# Patient Record
Sex: FEMALE | Race: WHITE | NOT HISPANIC OR LATINO | Employment: FULL TIME | URBAN - METROPOLITAN AREA
[De-identification: names, ages, dates, MRNs, and addresses within clinical notes are randomized per-mention and may not be internally consistent; named-entity substitution may affect disease eponyms.]

---

## 2017-05-31 ENCOUNTER — APPOINTMENT (OUTPATIENT)
Dept: LAB | Facility: CLINIC | Age: 52
End: 2017-05-31
Payer: COMMERCIAL

## 2017-05-31 ENCOUNTER — TRANSCRIBE ORDERS (OUTPATIENT)
Dept: LAB | Facility: CLINIC | Age: 52
End: 2017-05-31

## 2017-05-31 DIAGNOSIS — M79.0 RHEUMATISM, UNSPECIFIED AND FIBROSITIS: Primary | ICD-10-CM

## 2017-05-31 DIAGNOSIS — M79.7 RHEUMATISM, UNSPECIFIED AND FIBROSITIS: Primary | ICD-10-CM

## 2017-05-31 DIAGNOSIS — M79.7 RHEUMATISM, UNSPECIFIED AND FIBROSITIS: ICD-10-CM

## 2017-05-31 DIAGNOSIS — M79.0 RHEUMATISM, UNSPECIFIED AND FIBROSITIS: ICD-10-CM

## 2017-05-31 LAB
25(OH)D3 SERPL-MCNC: 25.9 NG/ML (ref 30–100)
ALBUMIN SERPL BCP-MCNC: 3.7 G/DL (ref 3.5–5)
ALP SERPL-CCNC: 99 U/L (ref 46–116)
ALT SERPL W P-5'-P-CCNC: 52 U/L (ref 12–78)
ANION GAP SERPL CALCULATED.3IONS-SCNC: 7 MMOL/L (ref 4–13)
AST SERPL W P-5'-P-CCNC: 32 U/L (ref 5–45)
BILIRUB SERPL-MCNC: 0.4 MG/DL (ref 0.2–1)
BILIRUB UR QL STRIP: NEGATIVE
BUN SERPL-MCNC: 12 MG/DL (ref 5–25)
CALCIUM SERPL-MCNC: 8.7 MG/DL (ref 8.3–10.1)
CHLORIDE SERPL-SCNC: 107 MMOL/L (ref 100–108)
CK SERPL-CCNC: 123 U/L (ref 26–192)
CLARITY UR: NORMAL
CO2 SERPL-SCNC: 26 MMOL/L (ref 21–32)
COLOR UR: YELLOW
CREAT SERPL-MCNC: 0.69 MG/DL (ref 0.6–1.3)
CRP SERPL QL: 15.3 MG/L
ERYTHROCYTE [DISTWIDTH] IN BLOOD BY AUTOMATED COUNT: 13.7 % (ref 11.6–15.1)
ERYTHROCYTE [SEDIMENTATION RATE] IN BLOOD: 9 MM/HOUR (ref 0–20)
GFR SERPL CREATININE-BSD FRML MDRD: >60 ML/MIN/1.73SQ M
GLUCOSE P FAST SERPL-MCNC: 104 MG/DL (ref 65–99)
GLUCOSE UR STRIP-MCNC: NEGATIVE MG/DL
HCT VFR BLD AUTO: 43 % (ref 34.8–46.1)
HGB BLD-MCNC: 14.1 G/DL (ref 11.5–15.4)
HGB UR QL STRIP.AUTO: NEGATIVE
IRON SERPL-MCNC: 78 UG/DL (ref 50–170)
KETONES UR STRIP-MCNC: NEGATIVE MG/DL
LEUKOCYTE ESTERASE UR QL STRIP: NEGATIVE
MAGNESIUM SERPL-MCNC: 2.1 MG/DL (ref 1.6–2.6)
MCH RBC QN AUTO: 31.2 PG (ref 26.8–34.3)
MCHC RBC AUTO-ENTMCNC: 32.8 G/DL (ref 31.4–37.4)
MCV RBC AUTO: 95 FL (ref 82–98)
NITRITE UR QL STRIP: NEGATIVE
PH UR STRIP.AUTO: 6 [PH] (ref 4.5–8)
PLATELET # BLD AUTO: 190 THOUSANDS/UL (ref 149–390)
PMV BLD AUTO: 12.2 FL (ref 8.9–12.7)
POTASSIUM SERPL-SCNC: 4 MMOL/L (ref 3.5–5.3)
PROT SERPL-MCNC: 7.1 G/DL (ref 6.4–8.2)
PROT UR STRIP-MCNC: NEGATIVE MG/DL
RBC # BLD AUTO: 4.52 MILLION/UL (ref 3.81–5.12)
SODIUM SERPL-SCNC: 140 MMOL/L (ref 136–145)
SP GR UR STRIP.AUTO: 1.02 (ref 1–1.03)
T4 FREE SERPL-MCNC: 0.87 NG/DL (ref 0.76–1.46)
TSH SERPL DL<=0.05 MIU/L-ACNC: 1.4 UIU/ML (ref 0.36–3.74)
UROBILINOGEN UR QL STRIP.AUTO: 0.2 E.U./DL
WBC # BLD AUTO: 4.68 THOUSAND/UL (ref 4.31–10.16)

## 2017-05-31 PROCEDURE — 83540 ASSAY OF IRON: CPT

## 2017-05-31 PROCEDURE — 86038 ANTINUCLEAR ANTIBODIES: CPT

## 2017-05-31 PROCEDURE — 86140 C-REACTIVE PROTEIN: CPT

## 2017-05-31 PROCEDURE — 82306 VITAMIN D 25 HYDROXY: CPT

## 2017-05-31 PROCEDURE — 80053 COMPREHEN METABOLIC PANEL: CPT

## 2017-05-31 PROCEDURE — 81003 URINALYSIS AUTO W/O SCOPE: CPT

## 2017-05-31 PROCEDURE — 83735 ASSAY OF MAGNESIUM: CPT

## 2017-05-31 PROCEDURE — 86235 NUCLEAR ANTIGEN ANTIBODY: CPT

## 2017-05-31 PROCEDURE — 84439 ASSAY OF FREE THYROXINE: CPT

## 2017-05-31 PROCEDURE — 86200 CCP ANTIBODY: CPT

## 2017-05-31 PROCEDURE — 82550 ASSAY OF CK (CPK): CPT

## 2017-05-31 PROCEDURE — 84443 ASSAY THYROID STIM HORMONE: CPT

## 2017-05-31 PROCEDURE — 81374 HLA I TYPING 1 ANTIGEN LR: CPT

## 2017-05-31 PROCEDURE — 86430 RHEUMATOID FACTOR TEST QUAL: CPT

## 2017-05-31 PROCEDURE — 85027 COMPLETE CBC AUTOMATED: CPT

## 2017-05-31 PROCEDURE — 36415 COLL VENOUS BLD VENIPUNCTURE: CPT

## 2017-05-31 PROCEDURE — 85652 RBC SED RATE AUTOMATED: CPT

## 2017-06-01 LAB
ENA SS-A AB SER-ACNC: >8 AI (ref 0–0.9)
ENA SS-B AB SER-ACNC: <0.2 AI (ref 0–0.9)
RHEUMATOID FACT SER QL LA: NEGATIVE

## 2017-06-02 LAB
CCP IGA+IGG SERPL IA-ACNC: 33 UNITS (ref 0–19)
RYE IGE QN: NEGATIVE

## 2017-06-06 LAB — HLA-B27 QL NAA+PROBE: NEGATIVE

## 2018-01-13 NOTE — MISCELLANEOUS
Provider Comments  Provider Comments:   patient did not show for the appt today as a NP, left message on her voice mail to rs      Signatures   Electronically signed by : ONUR Marsh ; Aug 13 2016 10:36PM EST                       (Author)

## 2018-01-13 NOTE — MISCELLANEOUS
Message  Received pt's imaging discs and reports Sent to [facility] for outside film review on 07/20/16 Spoke with Olivia La in [facility] She will have them uploaded and review done      Signatures   Electronically signed by :  Joanna Silva, ; Jul 20 2016  1:14PM EST                       (Author)

## 2018-07-22 ENCOUNTER — APPOINTMENT (EMERGENCY)
Dept: RADIOLOGY | Facility: HOSPITAL | Age: 53
End: 2018-07-22
Payer: COMMERCIAL

## 2018-07-22 ENCOUNTER — HOSPITAL ENCOUNTER (EMERGENCY)
Facility: HOSPITAL | Age: 53
Discharge: HOME | End: 2018-07-22
Attending: INTERNAL MEDICINE | Admitting: INTERNAL MEDICINE
Payer: COMMERCIAL

## 2018-07-22 VITALS
SYSTOLIC BLOOD PRESSURE: 138 MMHG | DIASTOLIC BLOOD PRESSURE: 83 MMHG | HEART RATE: 65 BPM | TEMPERATURE: 98.2 F | BODY MASS INDEX: 29.88 KG/M2 | WEIGHT: 175 LBS | RESPIRATION RATE: 18 BRPM | OXYGEN SATURATION: 99 % | HEIGHT: 64 IN

## 2018-07-22 DIAGNOSIS — M77.11 EPICONDYLITIS, LATERAL, RIGHT: Primary | ICD-10-CM

## 2018-07-22 PROCEDURE — 73080 X-RAY EXAM OF ELBOW: CPT

## 2018-07-22 PROCEDURE — 96372 THER/PROPH/DIAG INJ SC/IM: CPT

## 2018-07-22 PROCEDURE — 99283 EMERGENCY DEPT VISIT LOW MDM: CPT

## 2018-07-22 RX ORDER — NAPROXEN 500 MG/1
500 TABLET ORAL 2 TIMES DAILY WITH MEALS
Qty: 30 TABLET | Refills: 0 | Status: SHIPPED | OUTPATIENT
Start: 2018-07-22 | End: 2018-08-26

## 2018-07-22 RX ORDER — SUMATRIPTAN 50 MG/1
TABLET, FILM COATED ORAL
COMMUNITY

## 2018-07-22 RX ORDER — KETOROLAC TROMETHAMINE 30 MG/ML
30 INJECTION, SOLUTION INTRAMUSCULAR; INTRAVENOUS ONCE
Status: COMPLETED | OUTPATIENT
Start: 2018-07-22 | End: 2018-07-22

## 2018-07-22 RX ORDER — ESCITALOPRAM OXALATE 20 MG/1
TABLET ORAL
COMMUNITY

## 2018-07-22 RX ADMIN — KETOROLAC TROMETHAMINE 30 MG: 30 INJECTION, SOLUTION INTRAMUSCULAR at 16:33

## 2018-07-22 NOTE — DISCHARGE INSTRUCTIONS
Tennis Elbow   WHAT YOU NEED TO KNOW:   Tennis elbow is inflammation of the tendons in your elbow  Tendons are strong tissues that connect muscle to bone  DISCHARGE INSTRUCTIONS:   Return to the emergency department if:   · You suddenly have no feeling in your arm, hand, or fingers  · You suddenly cannot move your arm, wrist, hand, or fingers  Contact your healthcare provider if:   · You have a fever  · You have more pain or weakness in your arm, wrist, hand, or fingers  · You have new numbness or tingling in your arm, hand, or fingers  · You have questions or concerns about your condition or care  Medicines:   · Acetaminophen  decreases pain and fever  It is available without a doctor's order  Ask how much to take and how often to take it  Follow directions  Read the labels of all other medicines you are using to see if they also contain acetaminophen, or ask your doctor or pharmacist  Acetaminophen can cause liver damage if not taken correctly  Do not use more than 4 grams (4,000 milligrams) total of acetaminophen in one day  · NSAIDs , such as ibuprofen, help decrease swelling, pain, and fever  This medicine is available with or without a doctor's order  NSAIDs can cause stomach bleeding or kidney problems in certain people  If you take blood thinner medicine, always ask your healthcare provider if NSAIDs are safe for you  Always read the medicine label and follow directions  · Take your medicine as directed  Contact your healthcare provider if you think your medicine is not helping or if you have side effects  Tell him or her if you are allergic to any medicine  Keep a list of the medicines, vitamins, and herbs you take  Include the amounts, and when and why you take them  Bring the list or the pill bottles to follow-up visits  Carry your medicine list with you in case of an emergency    Physical therapy:  A physical therapist teaches you exercises to help improve movement and strength, and to decrease pain  Self-care:   · Wear your support device as directed  Devices, such as an arm strap, brace, or splint, help limit your arm movement  They also decrease pain and help prevent more damage to your tendon  Ask your healthcare provider how to care for your arm while you wear a brace or splint  · Rest your injured arm  and avoid activities that cause pain  This will help your tendons heal     · Apply ice on your elbow  for 15 to 20 minutes every hour or as directed  Use an ice pack, or put crushed ice in a plastic bag  Cover it with a towel before you apply it to your skin  Ice helps prevent tissue damage and decreases swelling and pain  · Elevate your elbow  above the level of your heart as often as you can  This will help decrease swelling and pain  Prop your elbow on pillows or blankets to keep it elevated comfortably  Follow up with your healthcare provider as directed:  Write down your questions so you remember to ask them during your visits  © 2017 [facility] Information is for End User's use only and may not be sold, redistributed or otherwise used for commercial purposes  All illustrations and images included in CareNotes® are the copyrighted property of A D A M , Inc  or J[employer]  The above information is an  only  It is not intended as medical advice for individual conditions or treatments  Talk to your doctor, nurse or pharmacist before following any medical regimen to see if it is safe and effective for you

## 2018-07-22 NOTE — ED PROVIDER NOTES
History  Chief Complaint   Patient presents with    Elbow Injury     According to the patient, she had hit her right elbow door hinge about 2 weeks ago  49-year-old female presents emergency room with right elbow pain  Patient states about 2 weeks ago she slammed her elbow into a doorjamb  She states the pain has been getting progressively worse and she is having numbness in her fingers  She is complaining of pain with extension and flexion  Prior to Admission Medications   Prescriptions Last Dose Informant Patient Reported? Taking? SUMAtriptan (IMITREX) 50 mg tablet   Yes No   Sig: sumatriptan 50 mg tablet   escitalopram (LEXAPRO) 20 mg tablet   Yes No   Sig: escitalopram 20 mg tablet      Facility-Administered Medications: None       Past Medical History:   Diagnosis Date    Arthritis     Fibromyalgia     Sjoegren syndrome ([facility] [address] )        Past Surgical History:   Procedure Laterality Date    HYSTERECTOMY      PARATHYROID GLAND SURGERY         History reviewed  No pertinent family history  I have reviewed and agree with the history as documented  Social History   Substance Use Topics    Smoking status: Never Smoker    Smokeless tobacco: Never Used    Alcohol use Yes      Comment: occ  Review of Systems   Constitutional: Negative  Respiratory: Negative  Cardiovascular: Negative  Gastrointestinal: Negative  Genitourinary: Negative  Musculoskeletal: Negative  Right elbow pain but no swelling no erythema the skin is intact  Skin: Negative  Neurological: Negative  Hematological: Negative  Psychiatric/Behavioral: Negative  Physical Exam  Physical Exam   Constitutional: She is oriented to person, place, and time  She appears well-developed and well-nourished  HENT:   Head: Normocephalic and atraumatic  Neck: Normal range of motion  Cardiovascular: Normal rate and regular rhythm      Pulmonary/Chest: Effort normal and breath sounds normal  Musculoskeletal:   Patient's upper extremity examination shows arms to be symmetric  There is no swelling in the right elbow or wrist of the upper extremity  She has a normal mass tone sensation DTRs are intact pulses are equal bilaterally  Capillary refill is intact  Patient has decreased grasp in her right hand  And she does have pain on extension and flexion, she is able to supinate and pronate with minimal discomfort  Neurological: She is alert and oriented to person, place, and time  Skin: Skin is dry  Psychiatric: She has a normal mood and affect  Her behavior is normal  Judgment and thought content normal        Vital Signs  ED Triage Vitals [07/22/18 1454]   Temperature Pulse Respirations Blood Pressure SpO2   98 9 °F (37 2 °C) 78 18 147/72 99 %      Temp Source Heart Rate Source Patient Position - Orthostatic VS BP Location FiO2 (%)   Tympanic Monitor Lying Right arm --      Pain Score       7           Vitals:    07/22/18 1454   BP: 147/72   Pulse: 78   Patient Position - Orthostatic VS: Lying       Visual Acuity      ED Medications  Medications   ketorolac (TORADOL) injection 30 mg (30 mg Intramuscular Given 7/22/18 1633)       Diagnostic Studies  Results Reviewed     None                 XR elbow 3+ vw RIGHT   Final Result by Trista Andino (07/22 1618)                 Procedures  Procedures       Phone Contacts  ED Phone Contact    ED Course                               MDM  CritCare Time    Disposition  Final diagnoses:   None     ED Disposition     None      Follow-up Information    None         Patient's Medications   Discharge Prescriptions    No medications on file     No discharge procedures on file      ED Provider  Electronically Signed by           Trudy Moreno MD  07/22/18 9438       Trudy Moreno MD  07/22/18 647 Remy Orellana MD  07/22/18 6603

## 2018-08-26 ENCOUNTER — HOSPITAL ENCOUNTER (EMERGENCY)
Facility: HOSPITAL | Age: 53
Discharge: HOME | End: 2018-08-27
Admitting: EMERGENCY MEDICINE
Payer: COMMERCIAL

## 2018-08-26 ENCOUNTER — APPOINTMENT (EMERGENCY)
Dept: RADIOLOGY | Facility: HOSPITAL | Age: 53
End: 2018-08-26
Payer: COMMERCIAL

## 2018-08-26 DIAGNOSIS — S90.32XA CONTUSION OF LEFT FOOT, INITIAL ENCOUNTER: Primary | ICD-10-CM

## 2018-08-26 PROCEDURE — 96372 THER/PROPH/DIAG INJ SC/IM: CPT

## 2018-08-26 PROCEDURE — 73620 X-RAY EXAM OF FOOT: CPT

## 2018-08-26 RX ORDER — OXYCODONE HYDROCHLORIDE AND ACETAMINOPHEN 5; 325 MG/1; MG/1
1 TABLET ORAL ONCE
Status: COMPLETED | OUTPATIENT
Start: 2018-08-27 | End: 2018-08-27

## 2018-08-26 RX ORDER — KETOROLAC TROMETHAMINE 30 MG/ML
30 INJECTION, SOLUTION INTRAMUSCULAR; INTRAVENOUS ONCE
Status: COMPLETED | OUTPATIENT
Start: 2018-08-26 | End: 2018-08-26

## 2018-08-26 RX ORDER — TRAMADOL HYDROCHLORIDE 50 MG/1
50 TABLET ORAL EVERY 6 HOURS PRN
COMMUNITY

## 2018-08-26 RX ADMIN — KETOROLAC TROMETHAMINE 30 MG: 30 INJECTION, SOLUTION INTRAMUSCULAR; INTRAVENOUS at 23:28

## 2018-08-27 VITALS
TEMPERATURE: 98.5 F | WEIGHT: 180 LBS | RESPIRATION RATE: 16 BRPM | DIASTOLIC BLOOD PRESSURE: 79 MMHG | OXYGEN SATURATION: 96 % | BODY MASS INDEX: 30.73 KG/M2 | SYSTOLIC BLOOD PRESSURE: 126 MMHG | HEIGHT: 64 IN | HEART RATE: 74 BPM

## 2018-08-27 PROCEDURE — 99283 EMERGENCY DEPT VISIT LOW MDM: CPT

## 2018-08-27 RX ORDER — KETOROLAC TROMETHAMINE 10 MG/1
10 TABLET, FILM COATED ORAL 3 TIMES DAILY
Qty: 15 TABLET | Refills: 0 | Status: SHIPPED | OUTPATIENT
Start: 2018-08-27 | End: 2018-12-17

## 2018-08-27 RX ORDER — OXYCODONE HYDROCHLORIDE AND ACETAMINOPHEN 5; 325 MG/1; MG/1
1 TABLET ORAL EVERY 4 HOURS PRN
Qty: 8 TABLET | Refills: 0 | Status: SHIPPED | OUTPATIENT
Start: 2018-08-27 | End: 2018-08-29

## 2018-08-27 RX ADMIN — OXYCODONE HYDROCHLORIDE AND ACETAMINOPHEN 1 TABLET: 5; 325 TABLET ORAL at 00:01

## 2018-08-27 NOTE — ED PROVIDER NOTES
History  Chief Complaint   Patient presents with    Foot Pain     fell from stool approx 2 ft  unable to move toes     Darrall Reeve and hurt left foot  No other injuries            Prior to Admission Medications   Prescriptions Last Dose Informant Patient Reported? Taking? SUMAtriptan (IMITREX) 50 mg tablet Past Month at Unknown time  Yes Yes   Sig: sumatriptan 50 mg tablet   escitalopram (LEXAPRO) 20 mg tablet 8/25/2018 at Unknown time  Yes Yes   Sig: escitalopram 20 mg tablet   traMADol (ULTRAM) 50 mg tablet Past Month at Unknown time  Yes Yes   Sig: Take 50 mg by mouth every 6 (six) hours as needed for moderate pain      Facility-Administered Medications: None       Past Medical History:   Diagnosis Date    Arthritis     Fibromyalgia     Fibromyalgia     Sjoegren syndrome (H[facility])        Past Surgical History:   Procedure Laterality Date    HYSTERECTOMY      PARATHYROID GLAND SURGERY      PARATHYROIDECTOMY      VEIN LIGATION AND STRIPPING         No family history on file  I have reviewed and agree with the history as documented  Social History   Substance Use Topics    Smoking status: Never Smoker    Smokeless tobacco: Never Used    Alcohol use Yes      Comment: occ  Review of Systems   Constitutional: Negative for chills and fever  HENT: Negative for nosebleeds  Respiratory: Negative for chest tightness and shortness of breath  Cardiovascular: Negative for chest pain  Gastrointestinal: Negative for abdominal pain  Musculoskeletal: Positive for joint swelling  Negative for back pain  Neurological: Negative for headaches  Physical Exam  Physical Exam   Constitutional: She appears well-developed and well-nourished  She appears distressed  HENT:   Head: Normocephalic and atraumatic  Eyes: Conjunctivae are normal    Neck: Normal range of motion  Pulmonary/Chest: Effort normal    Musculoskeletal:        Feet:    Skin: Skin is warm  No rash noted     Psychiatric: She has a normal mood and affect  Vital Signs  ED Triage Vitals [08/26/18 2309]   Temperature Pulse Respirations Blood Pressure SpO2   98 2 °F (36 8 °C) 65 20 115/66 97 %      Temp Source Heart Rate Source Patient Position - Orthostatic VS BP Location FiO2 (%)   Tympanic Monitor Lying Left arm --      Pain Score       Worst Possible Pain           Vitals:    08/26/18 2309 08/27/18 0041   BP: 115/66 126/79   Pulse: 65 74   Patient Position - Orthostatic VS: Lying Sitting       Visual Acuity      ED Medications  Medications   ketorolac (TORADOL) injection 30 mg (30 mg Intramuscular Given 8/26/18 2328)   oxyCODONE-acetaminophen (PERCOCET) 5-325 mg per tablet 1 tablet (1 tablet Oral Given 8/27/18 0001)       Diagnostic Studies  Results Reviewed     None                 XR foot 2 views LEFT   Final Result by Kay Navarro (08/27 0004)   Impression:      No evidence of acute fracture, dislocation, or radiopaque foreign body  Signed by Kay Navarro MD                 Procedures  Procedures       Phone Contacts  ED Phone Contact    ED Course  ED Course as of Aug 27 0048   Mon Aug 27, 2018   5176 No fractures seen by radiologist on review  Crutches  Moderate pain persists after Toradol, so will give 2 days supply OxyCodone  (Prescription was prdered & printed twice, but only one (8 pills) was actually given to patient)  Told follow up with FMD,                                MDM  CritCare Time    Disposition  Final diagnoses:   Contusion of left foot, initial encounter     Time reflects when diagnosis was documented in both MDM as applicable and the Disposition within this note     Time User Action Codes Description Comment    8/27/2018 12:31 AM Primitivo Rowe Add [S90 32XA] Contusion of left foot, initial encounter       ED Disposition     ED Disposition Condition Comment    Discharge  [facility] discharge to home/self care      Condition at discharge: Stable        Follow-up Information    None Patient's Medications   Discharge Prescriptions    KETOROLAC (TORADOL) 10 MG TABLET    Take 1 tablet (10 mg total) by mouth 3 (three) times a day for 5 days       Start Date: 8/27/2018 End Date: 9/1/2018       Order Dose: 10 mg       Quantity: 15 tablet    Refills: 0    OXYCODONE-ACETAMINOPHEN (PERCOCET) 5-325 MG PER TABLET    Take 1 tablet by mouth every 4 (four) hours as needed for moderate pain for up to 2 days Max Daily Amount: 4 tablets       Start Date: 8/27/2018 End Date: 8/29/2018       Order Dose: 1 tablet       Quantity: 8 tablet    Refills: 0    OXYCODONE-ACETAMINOPHEN (PERCOCET) 5-325 MG PER TABLET    Take 1 tablet by mouth every 4 (four) hours as needed for moderate pain for up to 2 days Max Daily Amount: 4 tablets       Start Date: 8/27/2018 End Date: 8/29/2018       Order Dose: 1 tablet       Quantity: 8 tablet    Refills: 0     No discharge procedures on file      ED Provider  Electronically Signed by           Tracy Michel MD  08/27/18 9960

## 2018-08-27 NOTE — DISCHARGE INSTRUCTIONS
Foot Contusion   WHAT YOU NEED TO KNOW:   A foot contusion is a bruise to the foot  DISCHARGE INSTRUCTIONS:   Medicines:   · NSAIDs:  These medicines decrease swelling and pain  NSAIDs are available without a doctor's order  Ask your healthcare provider which medicine is right for you  Ask how much to take and when to take it  Take as directed  NSAIDs can cause stomach bleeding and kidney problems if not taken correctly  · Take your medicine as directed  Contact your healthcare provider if you think your medicine is not helping or if you have side effects  Tell him of her if you are allergic to any medicine  Keep a list of the medicines, vitamins, and herbs you take  Include the amounts, and when and why you take them  Bring the list or the pill bottles to follow-up visits  Carry your medicine list with you in case of an emergency  Follow up with your healthcare provider as directed:  Write down your questions so you remember to ask them during your visits  Care for your foot: Follow your treatment plan to help decrease your pain and improve your muscle movement  · Rest:  You will need to rest your foot for 1 to 2 days after your injury  This will help decrease the risk of more damage  · Ice:  Ice helps decrease swelling and pain  Ice may also help prevent tissue damage  Use an ice pack, or put crushed ice in a plastic bag  Cover it with a towel and place it on your foot for 15 to 20 minutes every hour or as directed  · Compression:  Compression (tight hold) provides support and helps decrease swelling and movement so your foot can heal  You may be told to keep your foot wrapped with a tight elastic bandage  Follow instructions about how to apply your bandage  Do not massage your foot  You could cause more damage or pain  · Elevation:  Keep your foot raised above the level of your heart while you are sitting or lying down  This will help decrease or limit swelling   Use pillows, blankets, or rolled towels to elevate your foot comfortably  Exercise your foot:  You may be given gentle exercises to improve your foot movement and help decrease stiffness  Ask when you can return to your normal activities or sports  Prevent another injury:   · Wear equipment to protect yourself when you play sports  · Make sure your shoes fit properly  · Always wear shoes on streets or sidewalks  · Clean spills off the floor right away to avoid slipping or hitting your foot  · Make sure your home is well lit when you get up during the night  This will help you avoid hurting your foot in the dark  Contact your healthcare provider if:   · You have increased swelling on your foot  · You have severe foot pain  · You are not able to move your foot  · You have questions or concerns about your injury or treatment  © 2017 [address] [employer] Information is for End User's use only and may not be sold, redistributed or otherwise used for commercial purposes  All illustrations and images included in CareNotes® are the copyrighted property of A D A M [employer] Inc  or J[employer] Pe[employer]  The above information is an  only  It is not intended as medical advice for individual conditions or treatments  Talk to your doctor, nurse or pharmacist before following any medical regimen to see if it is safe and effective for you   ~~~ Use crutches,  If using Percocet pain pill, then do NOT drive, climb, drink alcohol, or run dangerous machinery  See your regular Family Doctor if foot not all better after 8 days

## 2018-12-17 ENCOUNTER — APPOINTMENT (EMERGENCY)
Dept: RADIOLOGY | Facility: HOSPITAL | Age: 53
End: 2018-12-17
Payer: COMMERCIAL

## 2018-12-17 ENCOUNTER — HOSPITAL ENCOUNTER (EMERGENCY)
Facility: HOSPITAL | Age: 53
Discharge: HOME | End: 2018-12-17
Attending: EMERGENCY MEDICINE | Admitting: EMERGENCY MEDICINE
Payer: COMMERCIAL

## 2018-12-17 VITALS
RESPIRATION RATE: 18 BRPM | HEART RATE: 86 BPM | OXYGEN SATURATION: 97 % | WEIGHT: 180 LBS | TEMPERATURE: 98.6 F | HEIGHT: 65 IN | DIASTOLIC BLOOD PRESSURE: 83 MMHG | BODY MASS INDEX: 29.99 KG/M2 | SYSTOLIC BLOOD PRESSURE: 146 MMHG

## 2018-12-17 DIAGNOSIS — J40 BRONCHITIS: Primary | ICD-10-CM

## 2018-12-17 PROCEDURE — 94640 AIRWAY INHALATION TREATMENT: CPT

## 2018-12-17 PROCEDURE — 99283 EMERGENCY DEPT VISIT LOW MDM: CPT

## 2018-12-17 PROCEDURE — 71046 X-RAY EXAM CHEST 2 VIEWS: CPT

## 2018-12-17 PROCEDURE — 94760 N-INVAS EAR/PLS OXIMETRY 1: CPT

## 2018-12-17 RX ORDER — PREDNISONE 20 MG/1
20 TABLET ORAL DAILY
Qty: 5 TABLET | Refills: 0 | Status: SHIPPED | OUTPATIENT
Start: 2018-12-17 | End: 2018-12-22

## 2018-12-17 RX ORDER — AZITHROMYCIN 250 MG/1
500 TABLET, FILM COATED ORAL ONCE
Status: COMPLETED | OUTPATIENT
Start: 2018-12-17 | End: 2018-12-17

## 2018-12-17 RX ORDER — PREDNISONE 20 MG/1
20 TABLET ORAL ONCE
Status: COMPLETED | OUTPATIENT
Start: 2018-12-17 | End: 2018-12-17

## 2018-12-17 RX ORDER — AZITHROMYCIN 250 MG/1
TABLET, FILM COATED ORAL
Qty: 4 TABLET | Refills: 0 | Status: SHIPPED | OUTPATIENT
Start: 2018-12-17 | End: 2018-12-21

## 2018-12-17 RX ORDER — IPRATROPIUM BROMIDE AND ALBUTEROL SULFATE 2.5; .5 MG/3ML; MG/3ML
3 SOLUTION RESPIRATORY (INHALATION)
Status: DISCONTINUED | OUTPATIENT
Start: 2018-12-17 | End: 2018-12-17

## 2018-12-17 RX ADMIN — AZITHROMYCIN 500 MG: 250 TABLET, FILM COATED ORAL at 20:14

## 2018-12-17 RX ADMIN — PREDNISONE 20 MG: 20 TABLET ORAL at 19:43

## 2018-12-17 RX ADMIN — IPRATROPIUM BROMIDE AND ALBUTEROL SULFATE 3 ML: .5; 3 SOLUTION RESPIRATORY (INHALATION) at 19:43

## 2018-12-18 NOTE — ED NOTES
Pt to PCP 2 weeks ago and was prescribed an inhaler for Bronchitis, no relief just getting worse       Kennedi Giles RN  12/17/18 3257

## 2018-12-18 NOTE — DISCHARGE INSTRUCTIONS
Acute Bronchitis   WHAT YOU NEED TO KNOW:   Acute bronchitis is swelling and irritation in the air passages of your lungs  This irritation may cause you to cough or have other breathing problems  Acute bronchitis often starts because of another illness, such as a cold or the flu  The illness spreads from your nose and throat to your windpipe and airways  Bronchitis is often called a chest cold  Acute bronchitis lasts about 3 to 6 weeks and is usually not a serious illness  Your cough can last for several weeks  DISCHARGE INSTRUCTIONS:   Return to the emergency department if:   · You cough up blood  · Your lips or fingernails turn blue  · You feel like you are not getting enough air when you breathe  Contact your healthcare provider if:   · You have a fever  · Your breathing problems do not go away or get worse  · Your cough does not get better within 4 weeks  · You have questions or concerns about your condition or care  Self-care:   · Get more rest   Rest helps your body to heal  Slowly start to do more each day  Rest when you feel it is needed  · Avoid irritants in the air  Avoid chemicals, fumes, and dust  Wear a face mask if you must work around dust or fumes  Stay inside on days when air pollution levels are high  If you have allergies, stay inside when pollen counts are high  Do not use aerosol products, such as spray-on deodorant, bug spray, and hair spray  · Do not smoke or be around others who smoke  Nicotine and other chemicals in cigarettes and cigars damages the cilia that move mucus out of your lungs  Ask your healthcare provider for information if you currently smoke and need help to quit  E-cigarettes or smokeless tobacco still contain nicotine  Talk to your healthcare provider before you use these products  · Drink liquids as directed  Liquids help keep your air passages moist and help you cough up mucus   You may need to drink more liquids when you have acute bronchitis  Ask how much liquid to drink each day and which liquids are best for you  · Use a humidifier or vaporizer  Use a cool mist humidifier or a vaporizer to increase air moisture in your home  This may make it easier for you to breathe and help decrease your cough  Decrease risk for acute bronchitis:   · Get the vaccinations you need  Ask your healthcare provider if you should get vaccinated against the flu or pneumonia  · Prevent the spread of germs  You can decrease your risk of acute bronchitis and other illnesses by doing the following:     [facility] your hands often with soap and water  Carry germ-killing hand lotion or gel with you  You can use the lotion or gel to clean your hands when soap and water are not available  ¨ Do not touch your eyes, nose, or mouth unless you have washed your hands first     ¨ Always cover your mouth when you cough to prevent the spread of germs  It is best to cough into a tissue or your shirt sleeve instead of into your hand  Ask those around you cover their mouths when they cough  ¨ Try to avoid people who have a cold or the flu  If you are sick, stay away from others as much as possible  Medicines: Your healthcare provider may  give you any of the following:  · Ibuprofen or acetaminophen  are medicines that help lower your fever  They are available without a doctor's order  Ask your healthcare provider which medicine is right for you  Ask how much to take and how often to take it  Follow directions  These medicines can cause stomach bleeding if not taken correctly  Ibuprofen can cause kidney damage  Do not take ibuprofen if you have kidney disease, an ulcer, or allergies to aspirin  Acetaminophen can cause liver damage  Do not take more than 4,000 milligrams in 24 hours  · Decongestants  help loosen mucus in your lungs and make it easier to cough up  This can help you breathe easier  · Cough suppressants  decrease your urge to cough   If your cough produces mucus, do not take a cough suppressant unless your healthcare provider tells you to  Your healthcare provider may suggest that you take a cough suppressant at night so you can rest     · Inhalers  may be given  Your healthcare provider may give you one or more inhalers to help you breathe easier and cough less  An inhaler gives your medicine to open your airways  Ask your healthcare provider to show you how to use your inhaler correctly  · Take your medicine as directed  Contact your healthcare provider if you think your medicine is not helping or if you have side effects  Tell him of her if you are allergic to any medicine  Keep a list of the medicines, vitamins, and herbs you take  Include the amounts, and when and why you take them  Bring the list or the pill bottles to follow-up visits  Carry your medicine list with you in case of an emergency  Follow up with your healthcare provider as directed:  Write down questions you have so you will remember to ask them during your follow-up visits  © 2017 [address] [employer] Information is for End User's use only and may not be sold, redistributed or otherwise used for commercial purposes  All illustrations and images included in CareNotes® are the copyrighted property of A [employer]  or [employer] Pe[employer]  The above information is an  only  It is not intended as medical advice for individual conditions or treatments  Talk to your doctor, nurse or pharmacist before following any medical regimen to see if it is safe and effective for you       ~~~    Use Prednisone and Zithromax pill each day  Notify your regular [facility] if not feeling better after 2 days of medications  See ER if fevers or feel worse      ~~

## 2019-01-11 ENCOUNTER — TRANSCRIBE ORDERS (OUTPATIENT)
Dept: ADMINISTRATIVE | Facility: HOSPITAL | Age: 54
End: 2019-01-11

## 2019-01-11 DIAGNOSIS — R26.89 BALANCE DISORDER: ICD-10-CM

## 2019-01-11 DIAGNOSIS — R41.3 MEMORY LOSS: Primary | ICD-10-CM

## 2019-01-17 ENCOUNTER — HOSPITAL ENCOUNTER (OUTPATIENT)
Dept: MRI IMAGING | Facility: HOSPITAL | Age: 54
Discharge: HOME | End: 2019-01-17
Payer: COMMERCIAL

## 2019-01-17 DIAGNOSIS — R41.3 MEMORY LOSS: ICD-10-CM

## 2019-01-17 DIAGNOSIS — R26.89 BALANCE DISORDER: ICD-10-CM

## 2019-01-17 PROCEDURE — 70553 MRI BRAIN STEM W/O & W/DYE: CPT

## 2019-01-17 PROCEDURE — A9585 GADOBUTROL INJECTION: HCPCS | Performed by: RADIOLOGY

## 2019-01-17 RX ADMIN — GADOBUTROL 7.5 ML: 604.72 INJECTION INTRAVENOUS at 07:48

## 2020-10-09 ENCOUNTER — TRANSCRIBE ORDERS (OUTPATIENT)
Dept: LAB | Facility: CLINIC | Age: 55
End: 2020-10-09

## 2020-10-09 ENCOUNTER — LAB (OUTPATIENT)
Dept: LAB | Facility: CLINIC | Age: 55
End: 2020-10-09
Payer: COMMERCIAL

## 2020-10-09 ENCOUNTER — APPOINTMENT (OUTPATIENT)
Dept: RADIOLOGY | Facility: CLINIC | Age: 55
End: 2020-10-09
Payer: COMMERCIAL

## 2020-10-09 DIAGNOSIS — S80.01XA CONTUSION OF RIGHT KNEE AND LOWER LEG, INITIAL ENCOUNTER: ICD-10-CM

## 2020-10-09 DIAGNOSIS — R55 SYNCOPE, UNSPECIFIED SYNCOPE TYPE: ICD-10-CM

## 2020-10-09 DIAGNOSIS — R55 SYNCOPE, UNSPECIFIED SYNCOPE TYPE: Primary | ICD-10-CM

## 2020-10-09 DIAGNOSIS — D64.9 ANEMIA, UNSPECIFIED TYPE: ICD-10-CM

## 2020-10-09 DIAGNOSIS — S80.11XA CONTUSION OF RIGHT KNEE AND LOWER LEG, INITIAL ENCOUNTER: ICD-10-CM

## 2020-10-09 LAB
ALBUMIN SERPL BCP-MCNC: 4 G/DL (ref 3.5–5)
ALP SERPL-CCNC: 126 U/L (ref 46–116)
ALT SERPL W P-5'-P-CCNC: 44 U/L (ref 12–78)
ANION GAP SERPL CALCULATED.3IONS-SCNC: 2 MMOL/L (ref 4–13)
AST SERPL W P-5'-P-CCNC: 26 U/L (ref 5–45)
BASOPHILS # BLD AUTO: 0.03 THOUSANDS/ΜL (ref 0–0.1)
BASOPHILS NFR BLD AUTO: 0 % (ref 0–1)
BILIRUB SERPL-MCNC: 0.6 MG/DL (ref 0.2–1)
BUN SERPL-MCNC: 14 MG/DL (ref 5–25)
CALCIUM SERPL-MCNC: 8.9 MG/DL (ref 8.3–10.1)
CHLORIDE SERPL-SCNC: 107 MMOL/L (ref 100–108)
CO2 SERPL-SCNC: 31 MMOL/L (ref 21–32)
CREAT SERPL-MCNC: 0.82 MG/DL (ref 0.6–1.3)
EOSINOPHIL # BLD AUTO: 0.15 THOUSAND/ΜL (ref 0–0.61)
EOSINOPHIL NFR BLD AUTO: 2 % (ref 0–6)
ERYTHROCYTE [DISTWIDTH] IN BLOOD BY AUTOMATED COUNT: 13.5 % (ref 11.6–15.1)
GFR SERPL CREATININE-BSD FRML MDRD: 81 ML/MIN/1.73SQ M
GLUCOSE P FAST SERPL-MCNC: 91 MG/DL (ref 65–99)
HCT VFR BLD AUTO: 44.2 % (ref 34.8–46.1)
HGB BLD-MCNC: 14.5 G/DL (ref 11.5–15.4)
IMM GRANULOCYTES # BLD AUTO: 0.03 THOUSAND/UL (ref 0–0.2)
IMM GRANULOCYTES NFR BLD AUTO: 0 % (ref 0–2)
IRON SATN MFR SERPL: 21 %
IRON SERPL-MCNC: 66 UG/DL (ref 50–170)
LYMPHOCYTES # BLD AUTO: 1.23 THOUSANDS/ΜL (ref 0.6–4.47)
LYMPHOCYTES NFR BLD AUTO: 17 % (ref 14–44)
MCH RBC QN AUTO: 31.1 PG (ref 26.8–34.3)
MCHC RBC AUTO-ENTMCNC: 32.8 G/DL (ref 31.4–37.4)
MCV RBC AUTO: 95 FL (ref 82–98)
MONOCYTES # BLD AUTO: 0.46 THOUSAND/ΜL (ref 0.17–1.22)
MONOCYTES NFR BLD AUTO: 6 % (ref 4–12)
NEUTROPHILS # BLD AUTO: 5.28 THOUSANDS/ΜL (ref 1.85–7.62)
NEUTS SEG NFR BLD AUTO: 75 % (ref 43–75)
NRBC BLD AUTO-RTO: 0 /100 WBCS
PLATELET # BLD AUTO: 185 THOUSANDS/UL (ref 149–390)
PMV BLD AUTO: 11.6 FL (ref 8.9–12.7)
POTASSIUM SERPL-SCNC: 3.9 MMOL/L (ref 3.5–5.3)
PROT SERPL-MCNC: 7.9 G/DL (ref 6.4–8.2)
RBC # BLD AUTO: 4.66 MILLION/UL (ref 3.81–5.12)
SODIUM SERPL-SCNC: 140 MMOL/L (ref 136–145)
TIBC SERPL-MCNC: 314 UG/DL (ref 250–450)
TSH SERPL DL<=0.05 MIU/L-ACNC: 1.34 UIU/ML (ref 0.36–3.74)
WBC # BLD AUTO: 7.18 THOUSAND/UL (ref 4.31–10.16)

## 2020-10-09 PROCEDURE — 73590 X-RAY EXAM OF LOWER LEG: CPT

## 2020-10-09 PROCEDURE — 84443 ASSAY THYROID STIM HORMONE: CPT

## 2020-10-09 PROCEDURE — 83550 IRON BINDING TEST: CPT

## 2020-10-09 PROCEDURE — 36415 COLL VENOUS BLD VENIPUNCTURE: CPT

## 2020-10-09 PROCEDURE — 80053 COMPREHEN METABOLIC PANEL: CPT

## 2020-10-09 PROCEDURE — 83540 ASSAY OF IRON: CPT

## 2020-10-09 PROCEDURE — 85025 COMPLETE CBC W/AUTO DIFF WBC: CPT

## 2020-10-09 PROCEDURE — 73564 X-RAY EXAM KNEE 4 OR MORE: CPT

## 2021-01-28 ENCOUNTER — OFFICE VISIT (OUTPATIENT)
Dept: URGENT CARE | Facility: CLINIC | Age: 56
End: 2021-01-28
Payer: COMMERCIAL

## 2021-01-28 VITALS
OXYGEN SATURATION: 98 % | TEMPERATURE: 98.4 F | HEIGHT: 64 IN | BODY MASS INDEX: 31.58 KG/M2 | WEIGHT: 185 LBS | RESPIRATION RATE: 18 BRPM | HEART RATE: 100 BPM

## 2021-01-28 DIAGNOSIS — Z11.59 SPECIAL SCREENING EXAMINATION FOR UNSPECIFIED VIRAL DISEASE: Primary | ICD-10-CM

## 2021-01-28 PROCEDURE — 99213 OFFICE O/P EST LOW 20 MIN: CPT | Performed by: NURSE PRACTITIONER

## 2021-01-28 PROCEDURE — U0005 INFEC AGEN DETEC AMPLI PROBE: HCPCS | Performed by: NURSE PRACTITIONER

## 2021-01-28 PROCEDURE — U0003 INFECTIOUS AGENT DETECTION BY NUCLEIC ACID (DNA OR RNA); SEVERE ACUTE RESPIRATORY SYNDROME CORONAVIRUS 2 (SARS-COV-2) (CORONAVIRUS DISEASE [COVID-19]), AMPLIFIED PROBE TECHNIQUE, MAKING USE OF HIGH THROUGHPUT TECHNOLOGIES AS DESCRIBED BY CMS-2020-01-R: HCPCS | Performed by: NURSE PRACTITIONER

## 2021-01-28 NOTE — PATIENT INSTRUCTIONS
Patient instructed to self quarantine  Advised to avoid nsaids  If you develop prolonged high fever, worsening or productive cough, shortness of breath, difficulty breathing, chest pain, or any new or concerning symptoms please proceed ER  Instructed patient to call ER prior to arrival make them aware she is being test for covid  Patient verbalizes understanding  Start vitamin c 1g twice daily,vitamin d3 2000IU daily, and multivitamin  monitor pulse ox  Call PCP in 24 hours for f/u  [address]    Your healthcare provider and/or public health staff have evaluated you and have determined that you do not need to remain in the hospital at this time  At this time you can be isolated at home where you will be monitored by staff from your local or state health department  You should carefully follow the prevention and isolation steps below until a healthcare provider or local or state health department says that you can return to your normal activities  Stay home except to get medical care    People who are mildly ill with COVID-19 are able to isolate at home during their illness  You should restrict activities outside your home, except for getting medical care  Do not go to work, school, or public areas  Avoid using public transportation, ride-sharing, or taxis  Separate yourself from other people and animals in your home    People: As much as possible, you should stay in a specific room and away from other people in your home  Also, you should use a separate bathroom, if available  Animals: You should restrict contact with pets and other animals while you are sick with COVID-19, just like you would around other people  Although there have not been reports of pets or other animals becoming sick with COVID-19, it is still recommended that people sick with COVID-19 limit contact with animals until more information is known about the virus   When possible, have another member of your household care for your animals while you are sick  If you are sick with COVID-19, avoid contact with your pet, including petting, snuggling, being kissed or licked, and sharing food  If you must care for your pet or be around animals while you are sick, wash your hands before and after you interact with pets and wear a facemask  See COVID-19 and Animals for more information  Call ahead before visiting your doctor    If you have a medical appointment, call the healthcare provider and tell them that you have or may have COVID-19  This will help the healthcare providers office take steps to keep other people from getting infected or exposed  Wear a facemask    You should wear a facemask when you are around other people (e g , sharing a room or vehicle) or pets and before you enter a healthcare providers office  If you are not able to wear a facemask (for example, because it causes trouble breathing), then people who live with you should not stay in the same room with you, or they should wear a facemask if they enter your room  Cover your coughs and sneezes    Cover your mouth and nose with a tissue when you cough or sneeze  Throw used tissues in a lined trash can  Immediately wash your hands with soap and water for at least 20 seconds or, if soap and water are not available, clean your hands with an alcohol-based hand  that contains at least 60% alcohol  Clean your hands often    Wash your hands often with soap and water for at least 20 seconds, especially after blowing your nose, coughing, or sneezing; going to the bathroom; and before eating or preparing food  If soap and water are not readily available, use an alcohol-based hand  with at least 60% alcohol, covering all surfaces of your hands and rubbing them together until they feel dry  Soap and water are the best option if hands are visibly dirty  Avoid touching your eyes, nose, and mouth with unwashed hands      Avoid sharing personal household items    You should not share dishes, drinking glasses, cups, eating utensils, towels, or bedding with other people or pets in your home  After using these items, they should be washed thoroughly with soap and water  Clean all high-touch surfaces everyday    High touch surfaces include counters, tabletops, doorknobs, bathroom fixtures, toilets, phones, keyboards, tablets, and bedside tables  Also, clean any surfaces that may have blood, stool, or body fluids on them  Use a household cleaning spray or wipe, according to the label instructions  Labels contain instructions for safe and effective use of the cleaning product including precautions you should take when applying the product, such as wearing gloves and making sure you have good ventilation during use of the product  Monitor your symptoms    Seek prompt medical attention if your illness is worsening (e g , difficulty breathing)  Before seeking care, call your healthcare provider and tell them that you have, or are being evaluated for, COVID-19  Put on a facemask before you enter the facility  These steps will help the healthcare providers office to keep other people in the office or waiting room from getting infected or exposed  Ask your healthcare provider to call the local or [facility] health department  Persons who are placed under active monitoring or facilitated self-monitoring should follow instructions provided by their local health department or occupational health professionals, as appropriate  If you have a medical emergency and need to call 911, notify the dispatch personnel that you have, or are being evaluated for COVID-19  If possible, put on a facemask before emergency medical services arrive      Discontinuing home isolation    Patients with confirmed COVID-19 should remain under home isolation precautions until the following conditions are met:   - They have had no fever for at least 24 hours (that is one full day of no fever without the use medicine that reduces fevers)  AND  - other symptoms have improved (for example, when their cough or shortness of breath have improved)  AND  - If had mild or moderate illness, at least 10 days have passed since their symptoms first appeared or if severe illness (needed oxygen) or immunosuppressed, at least 20 days have passed since symptoms first appeared  Patients with confirmed COVID-19 should also notify close contacts (including their workplace) and ask that they self-quarantine  Currently, close contact is defined as being within 6 feet for 15 minutes or more from the period 24 hours starting 48 hours before symptom onset to the time at which the patient went into isolation  Close contacts of patients diagnosed with COVID-19 should be instructed by the patient to self-quarantine for 14 days from the last time of their last contact with the patient       Source: [employer]

## 2021-01-28 NOTE — PROGRESS NOTES
[facility]        NAME: Margo Ladd is a 54 y o  female  : 1965    MRN: [id number]  DATE: 2021  TIME: 2:26 PM    Assessment and Plan   Special screening examination for unspecified viral disease [Z11 59]  1  Special screening examination for unspecified viral disease  Novel Coronavirus (Covid-19),PCR [facility] - Office Collection         Patient Instructions     Patient Instructions   Patient instructed to self quarantine  Advised to avoid nsaids  If you develop prolonged high fever, worsening or productive cough, shortness of breath, difficulty breathing, chest pain, or any new or concerning symptoms please proceed ER  Instructed patient to call ER prior to arrival make them aware she is being test for covid  Patient verbalizes understanding  Start vitamin c 1g twice daily,vitamin d3 2000IU daily, and multivitamin  monitor pulse ox  Call PCP in 24 hours for f/u  [address]    Your healthcare provider and/or public health staff have evaluated you and have determined that you do not need to remain in the hospital at this time  At this time you can be isolated at home where you will be monitored by staff from your local or state health department  You should carefully follow the prevention and isolation steps below until a healthcare provider or local or state health department says that you can return to your normal activities  Stay home except to get medical care    People who are mildly ill with COVID-19 are able to isolate at home during their illness  You should restrict activities outside your home, except for getting medical care  Do not go to work, school, or public areas  Avoid using public transportation, ride-sharing, or taxis  Separate yourself from other people and animals in your home    People: As much as possible, you should stay in a specific room and away from other people in your home   Also, you should use a separate bathroom, if available  Animals: You should restrict contact with pets and other animals while you are sick with COVID-19, just like you would around other people  Although there have not been reports of pets or other animals becoming sick with COVID-19, it is still recommended that people sick with COVID-19 limit contact with animals until more information is known about the virus  When possible, have another member of your household care for your animals while you are sick  If you are sick with COVID-19, avoid contact with your pet, including petting, snuggling, being kissed or licked, and sharing food  If you must care for your pet or be around animals while you are sick, wash your hands before and after you interact with pets and wear a facemask  See COVID-19 and Animals for more information  Call ahead before visiting your doctor    If you have a medical appointment, call the healthcare provider and tell them that you have or may have COVID-19  This will help the healthcare providers office take steps to keep other people from getting infected or exposed  Wear a facemask    You should wear a facemask when you are around other people (e g , sharing a room or vehicle) or pets and before you enter a healthcare providers office  If you are not able to wear a facemask (for example, because it causes trouble breathing), then people who live with you should not stay in the same room with you, or they should wear a facemask if they enter your room  Cover your coughs and sneezes    Cover your mouth and nose with a tissue when you cough or sneeze  Throw used tissues in a lined trash can  Immediately wash your hands with soap and water for at least 20 seconds or, if soap and water are not available, clean your hands with an alcohol-based hand  that contains at least 60% alcohol      Clean your hands often    Wash your hands often with soap and water for at least 20 seconds, especially after blowing your nose, coughing, or sneezing; going to the bathroom; and before eating or preparing food  If soap and water are not readily available, use an alcohol-based hand  with at least 60% alcohol, covering all surfaces of your hands and rubbing them together until they feel dry  Soap and water are the best option if hands are visibly dirty  Avoid touching your eyes, nose, and mouth with unwashed hands  Avoid sharing personal household items    You should not share dishes, drinking glasses, cups, eating utensils, towels, or bedding with other people or pets in your home  After using these items, they should be washed thoroughly with soap and water  Clean all high-touch surfaces everyday    High touch surfaces include counters, tabletops, doorknobs, bathroom fixtures, toilets, phones, keyboards, tablets, and bedside tables  Also, clean any surfaces that may have blood, stool, or body fluids on them  Use a household cleaning spray or wipe, according to the label instructions  Labels contain instructions for safe and effective use of the cleaning product including precautions you should take when applying the product, such as wearing gloves and making sure you have good ventilation during use of the product  Monitor your symptoms    Seek prompt medical attention if your illness is worsening (e g , difficulty breathing)  Before seeking care, call your healthcare provider and tell them that you have, or are being evaluated for, COVID-19  Put on a facemask before you enter the facility  These steps will help the healthcare providers office to keep other people in the office or waiting room from getting infected or exposed  Ask your healthcare provider to call the local or state health department  Persons who are placed under active monitoring or facilitated self-monitoring should follow instructions provided by their local health department or occupational health professionals, as appropriate    If you have a medical emergency and need to call 911, notify the dispatch personnel that you have, or are being evaluated for COVID-19  If possible, put on a facemask before emergency medical services arrive  Discontinuing home isolation    Patients with confirmed COVID-19 should remain under home isolation precautions until the following conditions are met:   - They have had no fever for at least 24 hours (that is one full day of no fever without the use medicine that reduces fevers)  AND  - other symptoms have improved (for example, when their cough or shortness of breath have improved)  AND  - If had mild or moderate illness, at least 10 days have passed since their symptoms first appeared or if severe illness (needed oxygen) or immunosuppressed, at least 20 days have passed since symptoms first appeared  Patients with confirmed COVID-19 should also notify close contacts (including their workplace) and ask that they self-quarantine  Currently, close contact is defined as being within 6 feet for 15 minutes or more from the period 24 hours starting 48 hours before symptom onset to the time at which the patient went into isolation  Close contacts of patients diagnosed with COVID-19 should be instructed by the patient to self-quarantine for 14 days from the last time of their last contact with the patient  Source: [employer]         Follow up with PCP in 3-5 days  Proceed to  ER if symptoms worsen  Chief Complaint     Chief Complaint   Patient presents with    Cough     cough, headache, congestion and loss of taste  started last night  History of Present Illness        Patient is a 54-year-old female presents with a 2 day history of cough, headache, and congestion  Patient notes loss of taste and smell that began this morning  Patient also complaining of chills and body aches  Denies fever  Denies feeling dizzy or lightheaded    Denies any sinus pain, earache, or sore throat  Denies any shortness of breath, hemoptysis, chest pain  Has been taking Tylenol and Imitrex for headache  Denies any recent sick contacts or known exposure to COVID-19  Review of Systems   Review of Systems   Constitutional: Positive for chills and fever  Negative for diaphoresis and fatigue  HENT: Positive for congestion, rhinorrhea and sinus pressure  Negative for ear pain, facial swelling, postnasal drip, sinus pain, sore throat, tinnitus and trouble swallowing  Eyes: Negative  Respiratory: Positive for cough  Negative for chest tightness, shortness of breath, wheezing and stridor  Cardiovascular: Negative for chest pain and palpitations  Gastrointestinal: Negative  Musculoskeletal: Negative for arthralgias, back pain, joint swelling, myalgias, neck pain and neck stiffness  Skin: Negative  Neurological: Positive for headaches  Negative for dizziness, syncope, facial asymmetry, weakness, light-headedness and numbness           Current Medications       Current Outpatient Medications:     SUMAtriptan (IMITREX) 50 mg tablet, sumatriptan 50 mg tablet, Disp: , Rfl:     traMADol (ULTRAM) 50 mg tablet, Take 50 mg by mouth every 6 (six) hours as needed for moderate pain, Disp: , Rfl:     escitalopram (LEXAPRO) 20 mg tablet, escitalopram 20 mg tablet, Disp: , Rfl:     Current Allergies     Allergies as of 01/28/2021    (No Known Allergies)            The following portions of the patient's history were reviewed and updated as appropriate: allergies, current medications, past family history, past medical history, past social history, past surgical history and problem list      Past Medical History:   Diagnosis Date    Arthritis     Fibromyalgia     Fibromyalgia     Sjoegren syndrome        Past Surgical History:   Procedure Laterality Date    HYSTERECTOMY      PARATHYROID GLAND SURGERY      PARATHYROIDECTOMY      VEIN LIGATION AND STRIPPING         No family history on file  Medications have been verified  Objective   Pulse 100   Temp 98 4 °F (36 9 °C)   Resp 18   Ht 5' 4" (1 626 m)   Wt 83 9 kg (185 lb)   SpO2 98%   BMI 31 76 kg/m²   No LMP recorded  Patient has had a hysterectomy  Physical Exam     Physical Exam  Constitutional:       General: She is not in acute distress  Appearance: She is well-developed  She is not diaphoretic  HENT:      Head: Normocephalic and atraumatic  Right Ear: Hearing, tympanic membrane, ear canal and external ear normal       Left Ear: Hearing, tympanic membrane, ear canal and external ear normal       Nose: Rhinorrhea present  No mucosal edema  Right Sinus: No maxillary sinus tenderness or frontal sinus tenderness  Left Sinus: No maxillary sinus tenderness or frontal sinus tenderness  Mouth/Throat:      Pharynx: Oropharynx is clear  Uvula midline  Tonsils: No tonsillar exudate or tonsillar abscesses  Cardiovascular:      Rate and Rhythm: Normal rate and regular rhythm  Heart sounds: Normal heart sounds, S1 normal and S2 normal    Pulmonary:      Effort: Pulmonary effort is normal       Breath sounds: Normal breath sounds and air entry  Lymphadenopathy:      Cervical: No cervical adenopathy  Skin:     General: Skin is warm and dry  Capillary Refill: Capillary refill takes less than 2 seconds  Neurological:      Mental Status: She is alert and oriented to person, place, and time

## 2021-01-29 ENCOUNTER — TELEPHONE (OUTPATIENT)
Dept: URGENT CARE | Facility: CLINIC | Age: 56
End: 2021-01-29

## 2021-01-29 LAB — SARS-COV-2 RNA RESP QL NAA+PROBE: POSITIVE

## 2021-01-30 ENCOUNTER — TELEPHONE (OUTPATIENT)
Dept: URGENT CARE | Facility: CLINIC | Age: 56
End: 2021-01-30

## 2021-01-30 NOTE — TELEPHONE ENCOUNTER
Attempted to call patient, message says " patient is not currently accepting calls"  Unable to leave message  Patient did review positive result on mychart  Attempted to call emergency contact with same message on this line

## 2021-02-05 ENCOUNTER — TELEPHONE (OUTPATIENT)
Dept: URGENT CARE | Facility: CLINIC | Age: 56
End: 2021-02-05

## 2021-02-05 NOTE — TELEPHONE ENCOUNTER
Attempted to call patient  Says "this number is not accepting calls at this time" [facility]

## 2023-09-06 ENCOUNTER — APPOINTMENT (OUTPATIENT)
Dept: RADIOLOGY | Facility: CLINIC | Age: 58
End: 2023-09-06
Payer: COMMERCIAL

## 2023-09-06 ENCOUNTER — OFFICE VISIT (OUTPATIENT)
Dept: URGENT CARE | Facility: CLINIC | Age: 58
End: 2023-09-06
Payer: COMMERCIAL

## 2023-09-06 VITALS
SYSTOLIC BLOOD PRESSURE: 143 MMHG | BODY MASS INDEX: 33.66 KG/M2 | TEMPERATURE: 98.6 F | WEIGHT: 190 LBS | HEART RATE: 84 BPM | DIASTOLIC BLOOD PRESSURE: 73 MMHG | RESPIRATION RATE: 16 BRPM | OXYGEN SATURATION: 97 % | HEIGHT: 63 IN

## 2023-09-06 DIAGNOSIS — S81.812A LACERATION OF LEFT LOWER EXTREMITY, INITIAL ENCOUNTER: Primary | ICD-10-CM

## 2023-09-06 DIAGNOSIS — S89.92XA INJURY OF LEFT LOWER LEG, INITIAL ENCOUNTER: ICD-10-CM

## 2023-09-06 PROCEDURE — 90715 TDAP VACCINE 7 YRS/> IM: CPT

## 2023-09-06 PROCEDURE — 90471 IMMUNIZATION ADMIN: CPT | Performed by: PHYSICIAN ASSISTANT

## 2023-09-06 PROCEDURE — 73590 X-RAY EXAM OF LOWER LEG: CPT

## 2023-09-06 PROCEDURE — 12001 RPR S/N/AX/GEN/TRNK 2.5CM/<: CPT | Performed by: PHYSICIAN ASSISTANT

## 2023-09-06 PROCEDURE — 99213 OFFICE O/P EST LOW 20 MIN: CPT | Performed by: PHYSICIAN ASSISTANT

## 2023-09-06 NOTE — PATIENT INSTRUCTIONS
Return in about 10 days for suture removal. No direct/submerging in water. But clean daily and check for signs of infection. Change bandage daily and apply topical antibiotic ointment. Call tomorrow for official xray results. Follow-up with PCP in the next 2-3 days for wound check. Go to the nearest ER for evaluation if any fevers, redness, warmth, discharge, streaking redness, redness that is circumferential, bleeding, pain, signs of infection or other concerning symptoms. Laceration   WHAT YOU NEED TO KNOW:   A laceration is an injury to the skin and the soft tissue underneath it. Lacerations can happen anywhere on the body. DISCHARGE INSTRUCTIONS:   Return to the emergency department if:   You have heavy bleeding or bleeding that does not stop after 10 minutes of holding firm, direct pressure over the wound. Your wound opens up. Call your doctor if:   You have a fever or chills. Your laceration is red, warm, or swollen. You have red streaks on your skin coming from your wound. You have white or yellow drainage from the wound that smells bad. You have pain that gets worse, even after treatment. You have questions or concerns about your condition or care. Medicines: You may need any of the following:  Prescription pain medicine  may be given. Ask your healthcare provider how to take this medicine safely. Some prescription pain medicines contain acetaminophen. Do not take other medicines that contain acetaminophen without talking to your healthcare provider. Too much acetaminophen may cause liver damage. Prescription pain medicine may cause constipation. Ask your healthcare provider how to prevent or treat constipation. Antibiotics  help treat or prevent a bacterial infection. Take your medicine as directed. Contact your healthcare provider if you think your medicine is not helping or if you have side effects. Tell your provider if you are allergic to any medicine.  Keep a list of the medicines, vitamins, and herbs you take. Include the amounts, and when and why you take them. Bring the list or the pill bottles to follow-up visits. Carry your medicine list with you in case of an emergency. Care for your wound as directed:   Do not get your wound wet  until your healthcare provider says it is okay. Do not soak your wound in water. Do not go swimming until your healthcare provider says it is okay. Carefully wash the wound with soap and water. Gently pat the area dry or allow it to air dry. Change your bandages  when they get wet, dirty, or after washing. Apply new, clean bandages as directed. Do not apply elastic bandages or tape too tight. Do not put powders or lotions over your incision. Apply antibiotic ointment as directed. Your healthcare provider may give you antibiotic ointment to put over your wound if you have stitches. If you have strips of tape over your incision, let them dry up and fall off on their own. If they do not fall off within 14 days, gently remove them. If you have glue over your wound, do not remove or pick at it. If your glue comes off, do not replace it with glue that you have at home. Check your wound every day for signs of infection, such as swelling, redness, or pus. Self-care:   Apply ice  on your wound for 15 to 20 minutes every hour or as directed. Use an ice pack, or put crushed ice in a plastic bag. Cover it with a towel. Ice helps prevent tissue damage and decreases swelling and pain. Use a splint as directed. A splint will decrease movement and stress on your wound. It may help it heal faster. A splint may be used for lacerations over joints or areas of your body that bend. Ask your healthcare provider how to apply and remove a splint. Decrease scarring of your wound  by applying ointments as directed. Do not apply ointments until your healthcare provider says it is okay. You may need to wait until your wound is healed.  Ask which ointment to buy and how often to use it. After your wound is healed, use sunscreen over the area when you are out in the sun. You should do this for at least 6 months to 1 year after your injury. Follow up with your doctor as directed: You may need to follow up in 24 to 48 hours to have your wound checked for infection. You will need to return in 3 to 14 days if you have stitches or staples so they can be removed. Care for your wound as directed to prevent infection and help it heal. Write down your questions so you remember to ask them during your visits. © Copyright [employer] 2022 Information is for End User's use only and may not be sold, redistributed or otherwise used for commercial purposes. The above information is an  only. It is not intended as medical advice for individual conditions or treatments. Talk to your doctor, nurse or pharmacist before following any medical regimen to see if it is safe and effective for you.

## 2023-09-06 NOTE — PROGRESS NOTES
[facility]        NAME: Dieudonne Novoa is a 62 y.o. female  : 1965    MRN: [id number]  DATE: 2023  TIME: 12:34 PM    Assessment and Plan   Laceration of left lower extremity, initial encounter [S82.232A]  1. Laceration of left lower extremity, initial encounter  Tdap Vaccine greater than or equal to 6yo    Laceration repair      2. Injury of left lower leg, initial encounter  XR tibia fibula 2 vw left        Xray- negative for obvious acute osseous abnormality, no obvious FB visualized, pending final read    Patient Instructions       Return in about 10 days for suture removal. No direct/submerging in water. But clean daily and check for signs of infection. Change bandage daily and apply topical antibiotic ointment. Call tomorrow for official xray results. Follow-up with PCP in the next 2-3 days for wound check. Go to the nearest ER for evaluation if any fevers, redness, warmth, discharge, streaking redness, redness that is circumferential, bleeding, pain, signs of infection or other concerning symptoms. Chief Complaint     Chief Complaint   Patient presents with   • Extremity Laceration     Dropped a glass shelf on her left leg today          History of Present Illness       51-year-old female presents for evaluation of left lower leg injury that occurred today just prior to arrival.  States she was moving a shelf when she accidentally dropped the corner of it and it hit her left lower leg, cutting her leg. Has not tried anything for it. States the shelf remained intact, no broken glass or anything. Denies FB sensation. States she came here for evaluation as she thought she may have needed sutures. States it has stopped bleeding. She denies any blood thinner use. States she did notice a bruise from where the shelf hit her lower leg. She denies any calf pain or swelling.   She denies any numbness, tingling, weakness, swelling, redness, warmth, drainage or other complaints. She does not know when her last tetanus vaccine was, she is requesting her tetanus vaccine at this time. Denies any pregnancy risk. Review of Systems   Review of Systems   Constitutional: Negative for chills, fatigue and fever. Respiratory: Negative for shortness of breath. Cardiovascular: Negative for chest pain. Gastrointestinal: Negative for abdominal pain, diarrhea, nausea and vomiting. Musculoskeletal: Positive for arthralgias. Negative for back pain, joint swelling and neck pain. Skin: Positive for wound. Negative for rash. Neurological: Negative for dizziness, weakness, numbness and headaches. All other systems reviewed and are negative. Current Medications       Current Outpatient Medications:   •  escitalopram (LEXAPRO) 20 mg tablet, escitalopram 20 mg tablet, Disp: , Rfl:   •  SUMAtriptan (IMITREX) 50 mg tablet, sumatriptan 50 mg tablet, Disp: , Rfl:   •  traMADol (ULTRAM) 50 mg tablet, Take 50 mg by mouth every 6 (six) hours as needed for moderate pain (Patient not taking: Reported on 9/6/2023), Disp: , Rfl:     Current Allergies     Allergies as of 09/06/2023   • (No Known Allergies)            The following portions of the patient's history were reviewed and updated as appropriate: allergies, current medications, past family history, past medical history, past social history, past surgical history and problem list.     Past Medical History:   Diagnosis Date   • Arthritis    • Fibromyalgia    • Fibromyalgia    • Sjoegren syndrome        Past Surgical History:   Procedure Laterality Date   • HYSTERECTOMY     • PARATHYROID GLAND SURGERY     • PARATHYROIDECTOMY     • VEIN LIGATION AND STRIPPING         History reviewed. No pertinent family history. Medications have been verified.         Objective   /73   Pulse 84   Temp 98.6 °F (37 °C)   Resp 16   Ht 5' 3" (1.6 m)   Wt 86.2 kg (190 lb)   SpO2 97%   BMI 33.66 kg/m²        Physical Exam     Physical Exam  Vitals and nursing note reviewed. Constitutional:       General: She is not in acute distress. Appearance: Normal appearance. She is well-developed. She is not toxic-appearing. Cardiovascular:      Rate and Rhythm: Normal rate and regular rhythm. Heart sounds: Normal heart sounds. Pulmonary:      Effort: Pulmonary effort is normal.      Breath sounds: Normal breath sounds. Musculoskeletal:      Left lower leg: Laceration and tenderness present. No swelling, deformity or bony tenderness. Legs:       Comments: Dorsiflexion/plantarflexion intact. Able to wiggle toes. Skin:     Capillary Refill: Capillary refill takes less than 2 seconds. Neurological:      General: No focal deficit present. Mental Status: She is alert and oriented to person, place, and time. Sensory: Sensation is intact. Motor: Motor function is intact. Deep Tendon Reflexes: Reflexes are normal and symmetric. Comments: NV intact with sensation and strong peripheral pulses. 5/5 strength of LE bilaterally   Psychiatric:         Behavior: Behavior normal.       Universal Protocol:  Consent: Verbal consent obtained.   Risks and benefits: risks, benefits and alternatives were discussed  Consent given by: patient  Patient understanding: patient states understanding of the procedure being performed  Patient identity confirmed: verbally with patient    Laceration repair    Date/Time: 9/6/2023 11:35 AM    Performed by: Elodia Rao PA-C  Authorized by: Elodia Rao PA-C  Body area: lower extremity  Location details: left lower leg  Laceration length: 2 cm  Foreign bodies: no foreign bodies  Tendon involvement: none  Nerve involvement: none  Anesthesia: local infiltration    Anesthesia:  Local Anesthetic: lidocaine 1% without epinephrine  Anesthetic total: 2 mL    Wound Dehiscence:  Superficial Wound Dehiscence: simple closure      Procedure Details:  Preparation: Patient was prepped and draped in the usual sterile fashion. Irrigation solution: saline  Irrigation method: syringe  Amount of cleaning: standard  Debridement: none  Skin closure: 4-0 nylon  Number of sutures: 6  Technique: simple  Approximation: close  Approximation difficulty: simple  Dressing: 4x4 sterile gauze  Patient tolerance: patient tolerated the procedure well with no immediate complications  Comments: Patient tolerated well. Bleeding controlled. Neurovascularly intact postprocedure.

## 2024-05-22 ENCOUNTER — OFFICE VISIT (OUTPATIENT)
Dept: RHEUMATOLOGY | Facility: CLINIC | Age: 59
End: 2024-05-22
Payer: COMMERCIAL

## 2024-05-22 VITALS
SYSTOLIC BLOOD PRESSURE: 164 MMHG | HEIGHT: 63 IN | OXYGEN SATURATION: 98 % | DIASTOLIC BLOOD PRESSURE: 100 MMHG | WEIGHT: 184 LBS | HEART RATE: 82 BPM | BODY MASS INDEX: 32.6 KG/M2

## 2024-05-22 DIAGNOSIS — M35.00 SJOGREN'S SYNDROME, WITH UNSPECIFIED ORGAN INVOLVEMENT (HCC): ICD-10-CM

## 2024-05-22 DIAGNOSIS — I73.00 RAYNAUD'S DISEASE WITHOUT GANGRENE: ICD-10-CM

## 2024-05-22 DIAGNOSIS — M06.00 SERONEGATIVE RHEUMATOID ARTHRITIS (HCC): ICD-10-CM

## 2024-05-22 DIAGNOSIS — M79.7 FIBROMYALGIA: Primary | ICD-10-CM

## 2024-05-22 PROCEDURE — 99204 OFFICE O/P NEW MOD 45 MIN: CPT | Performed by: INTERNAL MEDICINE

## 2024-05-22 RX ORDER — LEFLUNOMIDE 10 MG/1
10 TABLET ORAL DAILY
COMMUNITY

## 2024-05-22 RX ORDER — DULOXETIN HYDROCHLORIDE 60 MG/1
CAPSULE, DELAYED RELEASE ORAL
COMMUNITY
Start: 2024-05-12

## 2024-05-22 RX ORDER — CELECOXIB 200 MG/1
200 CAPSULE ORAL 2 TIMES DAILY
COMMUNITY
Start: 2024-02-07 | End: 2024-05-22 | Stop reason: ALTCHOICE

## 2024-05-22 RX ORDER — HYDROCODONE BITARTRATE AND ACETAMINOPHEN 7.5; 325 MG/1; MG/1
TABLET ORAL
COMMUNITY
Start: 2023-12-29

## 2024-05-22 RX ORDER — MELOXICAM 15 MG/1
15 TABLET ORAL DAILY
Qty: 30 TABLET | Refills: 6 | Status: SHIPPED | OUTPATIENT
Start: 2024-05-22

## 2024-05-22 RX ORDER — AMLODIPINE BESYLATE 5 MG/1
5 TABLET ORAL DAILY
Qty: 30 TABLET | Refills: 6 | Status: SHIPPED | OUTPATIENT
Start: 2024-05-22

## 2024-05-22 RX ORDER — PREGABALIN 50 MG/1
CAPSULE ORAL
COMMUNITY
Start: 2024-02-07 | End: 2024-05-22 | Stop reason: ALTCHOICE

## 2024-05-22 RX ORDER — IBUPROFEN 400 MG/1
400 TABLET ORAL EVERY 4 HOURS PRN
COMMUNITY
Start: 2023-12-20

## 2024-05-22 RX ORDER — GABAPENTIN 100 MG/1
100 CAPSULE ORAL 3 TIMES DAILY
Qty: 90 CAPSULE | Refills: 6 | Status: SHIPPED | OUTPATIENT
Start: 2024-05-22

## 2024-05-22 RX ORDER — OMEPRAZOLE 20 MG/1
20 CAPSULE, DELAYED RELEASE ORAL DAILY
COMMUNITY

## 2024-05-22 RX ORDER — ASPIRIN 81 MG/1
81 TABLET ORAL 2 TIMES DAILY
COMMUNITY
Start: 2023-12-29

## 2024-05-22 NOTE — PATIENT INSTRUCTIONS
Start gabapentin up to 3 times a day as needed for pain; start at bedtime  Water therapy referral made  Do labs   Schedule DEXA scan  Can take meloxicam daily as needed for joint pain, start after gabapentin    Return to clinic in 6 months

## 2024-05-22 NOTE — PROGRESS NOTES
RHEUMATOLOGY NEW PATIENT NOTE    Assessment and Plan:   Assessment & Plan  1. Fibromyalgia.  The patient's generalized pain appears to be more indicative of fibromyalgia, rather than rheumatoid arthritis. The potential side effects of Celebrex, leflunomide, and Lyrica were thoroughly discussed with the patient. A comprehensive panel of tests will be ordered to confirm the diagnosis of fibromyalgia. A DEXA scan will be scheduled due to her history of fracture and rheumatoid arthritis. Gabapentin 100 mg will be prescribed, to be taken up to 3 times daily as needed for numbness and pain. The patient has been advised to initially take gabapentin at bedtime and monitor for any signs of drowsiness. If the medication is effective, she may take it regularly thrice daily. The patient has been advised against combining Aleve with Aleve. The reintroduction of Celebrex will be considered after initiating gabapentin, but the patient has been advised not to commence it concurrently with gabapentin. Aquatic therapy will be initiated. The patient has been advised to work remotely and explore various job positions. Amlodipine will be introduced to manage blood pressure and Raynaud's symptoms.    2. Sjogren's syndrome.  The patient will continue with pilocarpine for symptomatic management.    3. Vitamin D deficiency.  The patient's vitamin D level is slightly low. The patient has been advised to take at least 2000 units of over-the-counter vitamin D daily.    Follow-up  The patient is scheduled for a follow-up visit in 6 months.    Start gabapentin up to 3 times a day as needed for pain; start at bedtime  Water therapy referral made  Do labs   Schedule DEXA scan  Can take meloxicam daily as needed for joint pain, start after gabapentin    Return to clinic in 6 months    Plan:  Diagnoses and all orders for this visit:    Fibromyalgia  -     SL Aquatic Therapy    Sjogren's syndrome, with unspecified organ involvement (HCC)  -      gabapentin (Neurontin) 100 mg capsule; Take 1 capsule (100 mg total) by mouth 3 (three) times a day    Raynaud's disease without gangrene  -     amLODIPine (NORVASC) 5 mg tablet; Take 1 tablet (5 mg total) by mouth daily    Seronegative rheumatoid arthritis (HCC)  -     Cyclic citrul peptide antibody, IgG  -     RF Screen w/ Reflex to Titer  -     CBC and differential  -     Comprehensive metabolic panel  -     C-reactive protein  -     Sedimentation rate, automated  -     Chronic Hepatitis Panel  -     Quantiferon TB Gold Plus Assay  -     DXA bone density spine hip and pelvis; Future  -     meloxicam (Mobic) 15 mg tablet; Take 1 tablet (15 mg total) by mouth daily As needed for joint pain, start taking after being on gabapentin  -     XR hand 3+ vw left; Future  -     XR hand 3+ vw right; Future    Other orders  -     Discontinue: pregabalin (LYRICA) 50 mg capsule; Take 1 in am and 2 at bedtime (Patient not taking: Reported on 5/22/2024)  -     leflunomide (ARAVA) 10 MG tablet; Take 10 mg by mouth daily  -     ibuprofen (MOTRIN) 400 mg tablet; Take 400 mg by mouth every 4 (four) hours as needed  -     HYDROcodone-acetaminophen (NORCO) 7.5-325 mg per tablet; Take 1 tablet by mouth every 6 hours as needed for pain for 2 days, THEN 1 tablet every 8 hours as needed for 2 days, THEN 1 tablet every 12 hours as needed for 2 days, THEN 1 tablet daily as needed. (Patient not taking: Reported on 5/22/2024)  -     DULoxetine (CYMBALTA) 60 mg delayed release capsule  -     Discontinue: celecoxib (CeleBREX) 200 mg capsule; Take 200 mg by mouth 2 (two) times a day  -     aspirin (ECOTRIN LOW STRENGTH) 81 mg EC tablet; Take 81 mg by mouth 2 (two) times a day  -     omeprazole (PriLOSEC) 20 mg delayed release capsule; Take 20 mg by mouth daily (Patient not taking: Reported on 5/22/2024)        Follow-up plan: RTC in 6 months        Chief Complaint  No chief complaint on file.      JD Abdi is a 59 y.o.  female who  presents as a Rheumatology consult referred by Meir Chowdhury DO for evaluation of Sjogren's syndrome and fibromyalgia.    History of Present Illness  The patient is a 59-year-old female who presents to establish care and for new patient evaluation.    The patient was previously under the care of a rheumatologist in [place], however, she expressed a dissatisfaction with the rheumatologist's belief. She has since sought a second opinion from a physician's assistant, Sera Monroe, from another practice. Despite trying various medications, her symptoms persist. Her quality of life is severely impacted, preventing her from working and causing difficulty in getting out of bed. She has been denied social security disability due to her symptoms, which have negatively impacted her intimate life. She reports fatigue, soreness, and aching, and experienced difficulty breathing while cleaning the kitchen yesterday. She has been diagnosed with rheumatoid arthritis, Sjogren's syndrome, and fibromyalgia. This week, she experienced a significant flare-up of her symptoms, including difficulty in performing tasks such as brushing or washing her hair. Her primary concern is her generalized body pain, with morning swelling in her hands. She underwent a total knee replacement on her right knee on 12/29/2023, which exacerbated her symptoms. Her left knee is also problematic. She has received steroid injections and gel injections, but experiences thrush with each injection. Prednisone has not been effective in managing her symptoms. She occasionally experiences flare-ups of certain joints, with her hands currently causing discomfort. Morning pain tends to improve, but pain resumes in the evening and night, particularly in cold weather. Her joints become hot, red, swollen, and stiff, but improve by lunchtime. She discontinued all medications due to side effects, including elevated blood pressure, hoarseness of throat, and severe  diarrhea. Her current medication regimen includes Cymbalta 60 mg daily for nerve pain and pain management. She discontinued Lyrica in 12/2023 due to severe cold in 09/2023, 10/2023, and 11/2023. She experienced side effects with methotrexate, Plaquenil, and Enbrel, which resulted in diarrhea. She was unable to inject Humira due to a popping sensation. Rinvoq was the most effective treatment for her, but she tolerated it well. She experiences burning and dry mouth, which do not concurrently affect her sleep. She experiences numbness and tingling in her feet, predominantly at night, but denies any other numbness or tingling. Her dry mouth is managed with pilocarpine. She was scheduled for eye drops for dry eyes, but this was not prescribed. Her sleep is disrupted due to pain. She experienced a 10-day hospital stay 6 years ago following a COVID-19 infection. She participated in a migraine study for severe migraines, which resulted in difficulty walking, climbing stairs, and bathing. Her body pain predates her rheumatoid arthritis. She is not currently taking a vitamin D supplement. She underwent a DEXA scan several years ago. She reports constant fatigue and falls asleep while driving. She takes ibuprofen, 4 tablets at bedtime, 4 in the morning, and 4 in the afternoon, which provides some relief. She has not tried naproxen. She has not tried meloxicam or diclofenac. She has not tried physical therapy or water therapy. She reports hyperventilation during showers. She quit school at 10th grade and has done everything she could to make herself a better person. She has been feeling hopeless for 4 years. Her fingertips and toes change colors with cold. She is not currently on any blood pressure medication.    Supplemental Information  She broke her arm in 2 spots from a fall. She also broke her toe.   Her mother, grandmother, and great grandmother had osteoporosis.       Review of Systems  See HPI    Allergies  No Known  Allergies    Home Medications    Current Outpatient Medications:     amLODIPine (NORVASC) 5 mg tablet, Take 1 tablet (5 mg total) by mouth daily, Disp: 30 tablet, Rfl: 6    aspirin (ECOTRIN LOW STRENGTH) 81 mg EC tablet, Take 81 mg by mouth 2 (two) times a day, Disp: , Rfl:     DULoxetine (CYMBALTA) 60 mg delayed release capsule, , Disp: , Rfl:     escitalopram (LEXAPRO) 20 mg tablet, escitalopram 20 mg tablet, Disp: , Rfl:     gabapentin (Neurontin) 100 mg capsule, Take 1 capsule (100 mg total) by mouth 3 (three) times a day, Disp: 90 capsule, Rfl: 6    ibuprofen (MOTRIN) 400 mg tablet, Take 400 mg by mouth every 4 (four) hours as needed, Disp: , Rfl:     meloxicam (Mobic) 15 mg tablet, Take 1 tablet (15 mg total) by mouth daily As needed for joint pain, start taking after being on gabapentin, Disp: 30 tablet, Rfl: 6    SUMAtriptan (IMITREX) 50 mg tablet, if needed, Disp: , Rfl:     HYDROcodone-acetaminophen (NORCO) 7.5-325 mg per tablet, Take 1 tablet by mouth every 6 hours as needed for pain for 2 days, THEN 1 tablet every 8 hours as needed for 2 days, THEN 1 tablet every 12 hours as needed for 2 days, THEN 1 tablet daily as needed. (Patient not taking: Reported on 5/22/2024), Disp: , Rfl:     leflunomide (ARAVA) 10 MG tablet, Take 10 mg by mouth daily, Disp: , Rfl:     omeprazole (PriLOSEC) 20 mg delayed release capsule, Take 20 mg by mouth daily (Patient not taking: Reported on 5/22/2024), Disp: , Rfl:     traMADol (ULTRAM) 50 mg tablet, Take 50 mg by mouth every 6 (six) hours as needed for moderate pain (Patient not taking: Reported on 9/6/2023), Disp: , Rfl:     Past Medical History  Past Medical History:   Diagnosis Date    Arthritis     Fibromyalgia     Fibromyalgia     Sjoegren syndrome        Past Surgical History   Past Surgical History:   Procedure Laterality Date    HYSTERECTOMY      PARATHYROID GLAND SURGERY      PARATHYROIDECTOMY      VEIN LIGATION AND STRIPPING         Family History  History  "reviewed. No pertinent family history.    Social History  Social History     Substance and Sexual Activity   Alcohol Use Not Currently     Social History     Substance and Sexual Activity   Drug Use No     Social History     Tobacco Use   Smoking Status Never   Smokeless Tobacco Never       Objective:  Vitals:    05/22/24 0824   BP: 164/100   Pulse: 82   SpO2: 98%   Weight: 83.5 kg (184 lb)   Height: 5' 3\" (1.6 m)       Physical Exam  Constitutional:       General: She is not in acute distress.  HENT:      Head: Normocephalic and atraumatic.   Eyes:      Conjunctiva/sclera: Conjunctivae normal.   Cardiovascular:      Rate and Rhythm: Normal rate and regular rhythm.      Heart sounds: S1 normal and S2 normal.      No friction rub.   Pulmonary:      Effort: Pulmonary effort is normal. No respiratory distress.      Breath sounds: Normal breath sounds. No wheezing, rhonchi or rales.   Musculoskeletal:         General: Tenderness present.      Cervical back: Neck supple.   Skin:     General: Skin is warm and dry.      Coloration: Skin is not pale.      Findings: No rash.   Neurological:      Mental Status: She is alert. Mental status is at baseline.   Psychiatric:         Mood and Affect: Mood normal.         Behavior: Behavior normal.       Physical Exam  There is tenderness in her left knee. There is significant tenderness in both wrists.    Reviewed labs and imaging.    Imaging:     Mammo screening bilateral w 3d & cad    Result Date: 5/9/2024  Narrative: This result has an attachment that is not available. HISTORY: Patient is 59 years old and is seen for a screening mammogram of both breasts. No relevant medical history has been documented for this patient. Surgical history includes breast cyst aspiration, hysterectomy, and oophorectomy. No relevant family history has been documented for this patient. No relevant hormone history has been documented for this patient. FILMS COMPARED: The present examination has been " compared to prior imaging studies.  Dated back to 3/29/2010. MAMMOGRAM FINDINGS: A minimum of two views were obtained. 3D tomosynthesis was performed. Computer-aided detection was utilized by the radiologist in the interpretation of this examination. The breasts have scattered areas of fibroglandular density. No suspicious masses, calcifications or other abnormalities are seen.    Impression: Impression: There is no mammographic evidence of malignancy. BI-RADS Category:  1 - Negative Follow Up Mamm in 1 Yr. is recommended. 5 Year Tyrer-Cuzick 8: 0.56% 10 Year Tyrer-Cuzick 8: 1.2% Lifetime Tyrer-Cuzick 8: 2.7% In patients with a documented history of breast cancer, male patients, patients legal sex is unknown or with an age less than 19 or 85 and greater a risk score will not be calculated. Based on the information provided by the patient, risk scores for breast cancer for 5 years, 10 years and lifetime was calculated using both breast density and family history.  Patients should consult with their referring providers regarding the significance of the score, potential need for additional risk assessment and supplemental screening including whole breast ultrasound and MRI Workstation: [id number]    US thyroid    Result Date: 5/8/2024  Narrative: History: Thyroid nodules Ultrasound of the thyroid gland was performed on 5/7/2024 Comparison Studies: Thyroid ultrasound dated 12/20/2023 Findings: Right thyroid lobe: 4.6 x 1.9 x 1.1 cm Left thyroid lobe: 4.7 x 1.2 x 1.1 cm Thyroid gland echotexture: Mildly heterogeneous Thyroid gland vascularity on color Doppler: Normal Nodules Right Lobe: None Nodules Left Lobe: Nodule 1 Left lower nodule measuring 8 x 6 x 3 mm Comparison: No change allowing for differences in measurement technique Composition: Solid Echotexture: Hyperechoic Margin: Regular Echogenic foci: None [place] Category: Low Nodules Isthmus: Nodule 1 Right isthmus nodule measuring 7 x 5 x 4 mm Comparison: No change  Composition: Solid Echotexture: Isoechoic Margin: Regular Echogenic foci: None [place] Category: Low Other findings: Tiny colloid cysts right lobe and left isthmus of no clinical significance.    Impression: IMPRESSION: No change subcentimeter low suspicion nodules within the inferior left thyroid lobe, and right isthmus. Sonographic pattern and FNA recommendations are based on American Thyroid Association ([place]) guidelines for assessment of thyroid nodules, as agreed upon by multidisciplinary departments at [facility]. Sonographic pattern Benign pattern (0% risk): no biopsy Very low suspicion pattern (<3% risk): biopsy if > or = 2 cm (or ultrasound observation) Low suspicion pattern (5-10% risk): biopsy if > or = 1.5 cm Intermediate suspicion pattern (10-20% risk): biopsy if > or = 1 cm High suspicion pattern (>70-90% risk): biopsy if > or = 1 cm (for nodules <1 cm, biopsy could be considered if technically feasible) Workstation:[id number]       Labs:   No visits with results within 6 Month(s) from this visit.   Latest known visit with results is:   Office Visit on 01/28/2021   Component Date Value Ref Range Status    SARS-CoV-2 01/28/2021 Positive (A)  Negative Final

## 2024-09-30 ENCOUNTER — HOSPITAL ENCOUNTER (OUTPATIENT)
Dept: BONE DENSITY | Facility: HOSPITAL | Age: 59
Discharge: HOME | End: 2024-09-30
Attending: INTERNAL MEDICINE
Payer: COMMERCIAL

## 2024-09-30 VITALS — BODY MASS INDEX: 33.49 KG/M2 | WEIGHT: 189 LBS | HEIGHT: 63 IN

## 2024-09-30 DIAGNOSIS — M06.00 SERONEGATIVE RHEUMATOID ARTHRITIS (HCC): ICD-10-CM

## 2024-09-30 PROCEDURE — 77080 DXA BONE DENSITY AXIAL: CPT
